# Patient Record
Sex: FEMALE
[De-identification: names, ages, dates, MRNs, and addresses within clinical notes are randomized per-mention and may not be internally consistent; named-entity substitution may affect disease eponyms.]

---

## 2022-10-21 ENCOUNTER — LAB OUTSIDE AN ENCOUNTER (OUTPATIENT)
Age: 62
End: 2022-10-21

## 2022-10-23 LAB
AG RATIO: 1
ALBUMIN LEVEL: 3.9
ALK PHOS: 97
ALT: 17
ANION GAP: 12
AST: 19
BILIRUBIN TOTAL: 0.4
BUN/CREAT RATIO: 18
BUN: 14
CALCIUM LEVEL: 9.2
CHLORIDE LEVEL: 106
CO2 LEVEL: 22
CREATININE LEVEL: 0.8
GFR 2021: >60
GLUCOSE LEVEL: 95
HEP A AB IGM: (no result)
HEP B CORE AB TOTAL: (no result)
HEP B SURF AB: (no result)
HEP B SURF AG: (no result)
HEP C AB: (no result)
MAGNESIUM LEVEL: 1.8
OSMO (CALC): 280
PERFORMING LAB: (no result)
POTASSIUM LEVEL: 4.2
PROTEIN TOTAL: 6.6
SODIUM LEVEL: 140

## 2022-11-18 ENCOUNTER — LAB OUTSIDE AN ENCOUNTER (OUTPATIENT)
Age: 62
End: 2022-11-18

## 2022-11-19 LAB
ABSOLUTE BASOPHIL COUNT: 0.03
ABSOLUTE EOSINOPHIL COUNT: 0.02
ABSOLUTE IMMATURE GRANULOCYTE  COUNT: 0.01
ABSOLUTE LYMPHOCYTE COUNT: 1.21
ABSOLUTE MONOCYTE COUNT: 0.3
ABSOLUTE NEUTROPHIL COUNT (ANC): 1.19
ABSOLUTE NRBC  COUNT: 0
BASOPHIL AUTO: 1
EOS AUTO: 1
HCT: 35.5
HGB: 11.9
IMMATURE GRANULOCYTES AUTO: 0.4
LYMPH AUTO: 44
MCH: 32.2
MCHC: 33.5
MCV: 95.9
MONO AUTO: 11
MPV: 8.8
NEUTRO AUTO: 43
NRBC AUTO: 0
PERFORMING LAB: (no result)
PERFORMING LAB: (no result)
PLATELETS: 225
RBC: 3.7
RDW: 12.5
WBC: 2.8

## 2023-05-20 ENCOUNTER — WEB ENCOUNTER (OUTPATIENT)
Dept: URBAN - METROPOLITAN AREA SURGERY CENTER 18 | Facility: SURGERY CENTER | Age: 63
End: 2023-05-20

## 2023-05-25 ENCOUNTER — CLAIMS CREATED FROM THE CLAIM WINDOW (OUTPATIENT)
Dept: URBAN - METROPOLITAN AREA CLINIC 4 | Facility: CLINIC | Age: 63
End: 2023-05-25
Payer: COMMERCIAL

## 2023-05-25 ENCOUNTER — OFFICE VISIT (OUTPATIENT)
Dept: URBAN - METROPOLITAN AREA SURGERY CENTER 18 | Facility: SURGERY CENTER | Age: 63
End: 2023-05-25
Payer: COMMERCIAL

## 2023-05-25 DIAGNOSIS — K63.89 OTHER SPECIFIED DISEASES OF INTESTINE: ICD-10-CM

## 2023-05-25 DIAGNOSIS — Z86.010 ADENOMAS PERSONAL HISTORY OF COLONIC POLYPS: ICD-10-CM

## 2023-05-25 PROCEDURE — 88305 TISSUE EXAM BY PATHOLOGIST: CPT | Performed by: PATHOLOGY

## 2023-05-25 PROCEDURE — 45380 COLONOSCOPY AND BIOPSY: CPT | Performed by: INTERNAL MEDICINE

## 2023-05-25 PROCEDURE — G8907 PT DOC NO EVENTS ON DISCHARG: HCPCS | Performed by: INTERNAL MEDICINE

## 2025-03-07 ENCOUNTER — OFFICE VISIT (OUTPATIENT)
Dept: URBAN - METROPOLITAN AREA CLINIC 96 | Facility: CLINIC | Age: 65
End: 2025-03-07
Payer: MEDICARE

## 2025-03-07 ENCOUNTER — DASHBOARD ENCOUNTERS (OUTPATIENT)
Age: 65
End: 2025-03-07

## 2025-03-07 VITALS
SYSTOLIC BLOOD PRESSURE: 128 MMHG | HEIGHT: 64 IN | WEIGHT: 119.8 LBS | HEART RATE: 108 BPM | TEMPERATURE: 97.5 F | DIASTOLIC BLOOD PRESSURE: 77 MMHG | BODY MASS INDEX: 20.45 KG/M2

## 2025-03-07 DIAGNOSIS — R94.8 ABNORMAL GASTROINTESTINAL PET SCAN: ICD-10-CM

## 2025-03-07 DIAGNOSIS — R13.19 INTERMITTENT DYSPHAGIA: ICD-10-CM

## 2025-03-07 DIAGNOSIS — Z79.01 CHRONIC ANTICOAGULATION: ICD-10-CM

## 2025-03-07 DIAGNOSIS — D84.9 IMMUNOCOMPROMISED: ICD-10-CM

## 2025-03-07 DIAGNOSIS — C80.1 CANCER: ICD-10-CM

## 2025-03-07 DIAGNOSIS — K44.9 HIATAL HERNIA: ICD-10-CM

## 2025-03-07 DIAGNOSIS — K22.9 ESOPHAGEAL ABNORMALITY: ICD-10-CM

## 2025-03-07 PROBLEM — 711150003: Status: ACTIVE | Noted: 2025-03-07

## 2025-03-07 PROBLEM — 363346000: Status: ACTIVE | Noted: 2025-03-07

## 2025-03-07 PROBLEM — 370388006: Status: ACTIVE | Noted: 2025-03-07

## 2025-03-07 PROCEDURE — 99214 OFFICE O/P EST MOD 30 MIN: CPT | Performed by: INTERNAL MEDICINE

## 2025-03-07 PROCEDURE — 99244 OFF/OP CNSLTJ NEW/EST MOD 40: CPT | Performed by: INTERNAL MEDICINE

## 2025-03-07 RX ORDER — PANTOPRAZOLE SODIUM 40 MG/1
TAPER AS DIRECTED TABLET, DELAYED RELEASE ORAL
Qty: 105 | Refills: 0 | OUTPATIENT
Start: 2025-03-07

## 2025-03-07 NOTE — HPI-TODAY'S VISIT:
This patient was referred by Dr. Paulina Varghese and Dr. Onel Benitez for an evaluation of abn pet.  A copy of this will be sent to the referring provider.  65 y.o. female, , 2 children [place], 2 grandchildren, has a puppy 4th line chemo for ovarian ca On PET scan, abn of eso Prior to cancer dx in 2020 - bread got stuck (had to walk around or throw it up) Last time happened was flour tortilla and cheese -- occ gets  Feels like food gets stuck rarely -- 6 times in past 10 years No current reflux or heartburn When on elahare, had bad acid reflux Labs [facility] portal reviewed - 2/25

## 2025-03-07 NOTE — PHYSICAL EXAM HENT:
Head, normocephalic, atraumatic, Face, Face within normal limits, Ears, External ears within normal limits; no thrush

## 2025-04-25 ENCOUNTER — TELEPHONE ENCOUNTER (OUTPATIENT)
Dept: URBAN - METROPOLITAN AREA CLINIC 96 | Facility: CLINIC | Age: 65
End: 2025-04-25

## 2025-04-25 ENCOUNTER — WEB ENCOUNTER (OUTPATIENT)
Dept: URBAN - METROPOLITAN AREA CLINIC 96 | Facility: CLINIC | Age: 65
End: 2025-04-25